# Patient Record
Sex: FEMALE | Race: WHITE | NOT HISPANIC OR LATINO | URBAN - METROPOLITAN AREA
[De-identification: names, ages, dates, MRNs, and addresses within clinical notes are randomized per-mention and may not be internally consistent; named-entity substitution may affect disease eponyms.]

---

## 2017-07-18 ENCOUNTER — OUTPATIENT (OUTPATIENT)
Dept: OUTPATIENT SERVICES | Facility: HOSPITAL | Age: 32
LOS: 1 days | End: 2017-07-18
Payer: COMMERCIAL

## 2017-07-18 PROCEDURE — 99214 OFFICE O/P EST MOD 30 MIN: CPT

## 2017-07-19 DIAGNOSIS — Z3A.00 WEEKS OF GESTATION OF PREGNANCY NOT SPECIFIED: ICD-10-CM

## 2017-07-19 DIAGNOSIS — O26.899 OTHER SPECIFIED PREGNANCY RELATED CONDITIONS, UNSPECIFIED TRIMESTER: ICD-10-CM

## 2017-07-30 ENCOUNTER — INPATIENT (INPATIENT)
Facility: HOSPITAL | Age: 32
LOS: 2 days | Discharge: HOME | End: 2017-08-02
Attending: OBSTETRICS & GYNECOLOGY | Admitting: OBSTETRICS & GYNECOLOGY
Payer: COMMERCIAL

## 2017-07-30 VITALS — WEIGHT: 143.96 LBS | HEIGHT: 61 IN

## 2017-07-30 LAB
BASOPHILS NFR BLD AUTO: 0.2 % — SIGNIFICANT CHANGE UP (ref 0–2)
BLD GP AB SCN SERPL QL: NEGATIVE — SIGNIFICANT CHANGE UP
EOSINOPHIL NFR BLD AUTO: 0.2 % — SIGNIFICANT CHANGE UP (ref 0–6)
HCT VFR BLD CALC: 35.7 % — SIGNIFICANT CHANGE UP (ref 34.5–45)
HGB BLD-MCNC: 11.9 G/DL — SIGNIFICANT CHANGE UP (ref 11.5–15.5)
LYMPHOCYTES # BLD AUTO: 9.9 % — LOW (ref 13–44)
MCHC RBC-ENTMCNC: 30.7 PG — SIGNIFICANT CHANGE UP (ref 27–34)
MCHC RBC-ENTMCNC: 33.3 G/DL — SIGNIFICANT CHANGE UP (ref 32–36)
MCV RBC AUTO: 92 FL — SIGNIFICANT CHANGE UP (ref 80–100)
MONOCYTES NFR BLD AUTO: 6.1 % — SIGNIFICANT CHANGE UP (ref 2–14)
NEUTROPHILS NFR BLD AUTO: 83.6 % — HIGH (ref 43–77)
PLATELET # BLD AUTO: 146 K/UL — LOW (ref 150–400)
RBC # BLD: 3.88 M/UL — SIGNIFICANT CHANGE UP (ref 3.8–5.2)
RBC # FLD: 12.9 % — SIGNIFICANT CHANGE UP (ref 10.3–16.9)
RH IG SCN BLD-IMP: POSITIVE — SIGNIFICANT CHANGE UP
WBC # BLD: 11.1 K/UL — HIGH (ref 3.8–10.5)
WBC # FLD AUTO: 11.1 K/UL — HIGH (ref 3.8–10.5)

## 2017-07-30 RX ORDER — SODIUM CHLORIDE 9 MG/ML
1000 INJECTION, SOLUTION INTRAVENOUS ONCE
Qty: 0 | Refills: 0 | Status: DISCONTINUED | OUTPATIENT
Start: 2017-07-30 | End: 2017-07-31

## 2017-07-30 RX ORDER — SODIUM CHLORIDE 9 MG/ML
1000 INJECTION, SOLUTION INTRAVENOUS
Qty: 0 | Refills: 0 | Status: DISCONTINUED | OUTPATIENT
Start: 2017-07-30 | End: 2017-07-31

## 2017-07-30 RX ORDER — OXYTOCIN 10 UNIT/ML
333.33 VIAL (ML) INJECTION
Qty: 20 | Refills: 0 | Status: DISCONTINUED | OUTPATIENT
Start: 2017-07-30 | End: 2017-07-31

## 2017-07-30 RX ORDER — CITRIC ACID/SODIUM CITRATE 300-500 MG
15 SOLUTION, ORAL ORAL EVERY 4 HOURS
Qty: 0 | Refills: 0 | Status: DISCONTINUED | OUTPATIENT
Start: 2017-07-30 | End: 2017-07-31

## 2017-07-31 RX ORDER — DIPHENHYDRAMINE HCL 50 MG
25 CAPSULE ORAL EVERY 6 HOURS
Qty: 0 | Refills: 0 | Status: DISCONTINUED | OUTPATIENT
Start: 2017-07-31 | End: 2017-08-02

## 2017-07-31 RX ORDER — GLYCERIN ADULT
1 SUPPOSITORY, RECTAL RECTAL AT BEDTIME
Qty: 0 | Refills: 0 | Status: DISCONTINUED | OUTPATIENT
Start: 2017-07-31 | End: 2017-08-02

## 2017-07-31 RX ORDER — AER TRAVELER 0.5 G/1
1 SOLUTION RECTAL; TOPICAL EVERY 4 HOURS
Qty: 0 | Refills: 0 | Status: DISCONTINUED | OUTPATIENT
Start: 2017-07-31 | End: 2017-08-02

## 2017-07-31 RX ORDER — MAGNESIUM HYDROXIDE 400 MG/1
30 TABLET, CHEWABLE ORAL
Qty: 0 | Refills: 0 | Status: DISCONTINUED | OUTPATIENT
Start: 2017-07-31 | End: 2017-08-02

## 2017-07-31 RX ORDER — ACETAMINOPHEN 500 MG
650 TABLET ORAL EVERY 6 HOURS
Qty: 0 | Refills: 0 | Status: DISCONTINUED | OUTPATIENT
Start: 2017-07-31 | End: 2017-08-02

## 2017-07-31 RX ORDER — BENZOCAINE 10 %
1 GEL (GRAM) MUCOUS MEMBRANE EVERY 6 HOURS
Qty: 0 | Refills: 0 | Status: DISCONTINUED | OUTPATIENT
Start: 2017-07-31 | End: 2017-08-02

## 2017-07-31 RX ORDER — IBUPROFEN 200 MG
600 TABLET ORAL EVERY 6 HOURS
Qty: 0 | Refills: 0 | Status: DISCONTINUED | OUTPATIENT
Start: 2017-07-31 | End: 2017-08-02

## 2017-07-31 RX ORDER — PRAMOXINE HYDROCHLORIDE 150 MG/15G
1 AEROSOL, FOAM RECTAL EVERY 4 HOURS
Qty: 0 | Refills: 0 | Status: DISCONTINUED | OUTPATIENT
Start: 2017-07-31 | End: 2017-08-02

## 2017-07-31 RX ORDER — HYDROCORTISONE 1 %
1 OINTMENT (GRAM) TOPICAL EVERY 4 HOURS
Qty: 0 | Refills: 0 | Status: DISCONTINUED | OUTPATIENT
Start: 2017-07-31 | End: 2017-08-02

## 2017-07-31 RX ORDER — OXYCODONE AND ACETAMINOPHEN 5; 325 MG/1; MG/1
2 TABLET ORAL EVERY 6 HOURS
Qty: 0 | Refills: 0 | Status: DISCONTINUED | OUTPATIENT
Start: 2017-07-31 | End: 2017-08-02

## 2017-07-31 RX ORDER — TETANUS TOXOID, REDUCED DIPHTHERIA TOXOID AND ACELLULAR PERTUSSIS VACCINE, ADSORBED 5; 2.5; 8; 8; 2.5 [IU]/.5ML; [IU]/.5ML; UG/.5ML; UG/.5ML; UG/.5ML
0.5 SUSPENSION INTRAMUSCULAR ONCE
Qty: 0 | Refills: 0 | Status: DISCONTINUED | OUTPATIENT
Start: 2017-07-31 | End: 2017-08-02

## 2017-07-31 RX ORDER — LEVOTHYROXINE SODIUM 125 MCG
50 TABLET ORAL DAILY
Qty: 0 | Refills: 0 | Status: DISCONTINUED | OUTPATIENT
Start: 2017-07-31 | End: 2017-07-31

## 2017-07-31 RX ORDER — SIMETHICONE 80 MG/1
80 TABLET, CHEWABLE ORAL EVERY 6 HOURS
Qty: 0 | Refills: 0 | Status: DISCONTINUED | OUTPATIENT
Start: 2017-07-31 | End: 2017-08-02

## 2017-07-31 RX ORDER — OXYTOCIN 10 UNIT/ML
41.67 VIAL (ML) INJECTION
Qty: 20 | Refills: 0 | Status: DISCONTINUED | OUTPATIENT
Start: 2017-07-31 | End: 2017-08-02

## 2017-07-31 RX ORDER — SODIUM CHLORIDE 9 MG/ML
3 INJECTION INTRAMUSCULAR; INTRAVENOUS; SUBCUTANEOUS EVERY 8 HOURS
Qty: 0 | Refills: 0 | Status: DISCONTINUED | OUTPATIENT
Start: 2017-07-31 | End: 2017-08-02

## 2017-07-31 RX ORDER — LANOLIN
1 OINTMENT (GRAM) TOPICAL EVERY 6 HOURS
Qty: 0 | Refills: 0 | Status: DISCONTINUED | OUTPATIENT
Start: 2017-07-31 | End: 2017-08-02

## 2017-07-31 RX ORDER — DOCUSATE SODIUM 100 MG
100 CAPSULE ORAL
Qty: 0 | Refills: 0 | Status: DISCONTINUED | OUTPATIENT
Start: 2017-07-31 | End: 2017-08-02

## 2017-07-31 RX ADMIN — Medication 100 MILLIGRAM(S): at 18:06

## 2017-07-31 RX ADMIN — Medication 650 MILLIGRAM(S): at 13:00

## 2017-07-31 RX ADMIN — AER TRAVELER 1 APPLICATION(S): 0.5 SOLUTION RECTAL; TOPICAL at 19:37

## 2017-07-31 RX ADMIN — SODIUM CHLORIDE 3 MILLILITER(S): 9 INJECTION INTRAMUSCULAR; INTRAVENOUS; SUBCUTANEOUS at 07:54

## 2017-07-31 RX ADMIN — Medication 600 MILLIGRAM(S): at 19:35

## 2017-07-31 RX ADMIN — Medication 600 MILLIGRAM(S): at 09:41

## 2017-07-31 RX ADMIN — Medication 600 MILLIGRAM(S): at 14:47

## 2017-07-31 RX ADMIN — SODIUM CHLORIDE 3 MILLILITER(S): 9 INJECTION INTRAMUSCULAR; INTRAVENOUS; SUBCUTANEOUS at 22:54

## 2017-07-31 RX ADMIN — Medication 600 MILLIGRAM(S): at 15:40

## 2017-07-31 RX ADMIN — Medication 1 TABLET(S): at 12:04

## 2017-07-31 RX ADMIN — Medication 650 MILLIGRAM(S): at 12:04

## 2017-07-31 RX ADMIN — Medication 1 SPRAY(S): at 19:37

## 2017-07-31 RX ADMIN — Medication 600 MILLIGRAM(S): at 08:24

## 2017-07-31 RX ADMIN — SODIUM CHLORIDE 3 MILLILITER(S): 9 INJECTION INTRAMUSCULAR; INTRAVENOUS; SUBCUTANEOUS at 14:49

## 2017-07-31 RX ADMIN — Medication 600 MILLIGRAM(S): at 20:30

## 2017-08-01 LAB — T PALLIDUM AB TITR SER: NEGATIVE — SIGNIFICANT CHANGE UP

## 2017-08-01 RX ADMIN — Medication 650 MILLIGRAM(S): at 22:59

## 2017-08-01 RX ADMIN — Medication 650 MILLIGRAM(S): at 09:51

## 2017-08-01 RX ADMIN — Medication 600 MILLIGRAM(S): at 13:45

## 2017-08-01 RX ADMIN — Medication 600 MILLIGRAM(S): at 01:37

## 2017-08-01 RX ADMIN — Medication 600 MILLIGRAM(S): at 19:54

## 2017-08-01 RX ADMIN — Medication 600 MILLIGRAM(S): at 07:54

## 2017-08-01 RX ADMIN — Medication 100 MILLIGRAM(S): at 05:23

## 2017-08-01 RX ADMIN — Medication 650 MILLIGRAM(S): at 23:50

## 2017-08-01 RX ADMIN — Medication 100 MILLIGRAM(S): at 19:54

## 2017-08-01 RX ADMIN — Medication 650 MILLIGRAM(S): at 10:07

## 2017-08-01 RX ADMIN — Medication 600 MILLIGRAM(S): at 08:16

## 2017-08-01 RX ADMIN — Medication 1 TABLET(S): at 13:46

## 2017-08-01 RX ADMIN — Medication 600 MILLIGRAM(S): at 20:41

## 2017-08-01 RX ADMIN — SODIUM CHLORIDE 3 MILLILITER(S): 9 INJECTION INTRAMUSCULAR; INTRAVENOUS; SUBCUTANEOUS at 19:05

## 2017-08-01 RX ADMIN — Medication 600 MILLIGRAM(S): at 13:59

## 2017-08-01 RX ADMIN — Medication 600 MILLIGRAM(S): at 02:10

## 2017-08-01 RX ADMIN — SODIUM CHLORIDE 3 MILLILITER(S): 9 INJECTION INTRAMUSCULAR; INTRAVENOUS; SUBCUTANEOUS at 05:23

## 2017-08-01 NOTE — PROGRESS NOTE ADULT - SUBJECTIVE AND OBJECTIVE BOX
Patient evaluated at bedside.  No acute events overnight.  She reports pain is well controlled with motrin.  She denies heavy vaginal bleeding.  Has some perineal discomfort in area of perineal laceration; pain somewhat relieved with motrin, ice packs and witch hazel pads.  She has been ambulating without assistance, voiding spontaneously, and is breastfeeding.    Physical Exam:  T(C): 36.6 (08-01-17 @ 06:00), Max: 36.6 (08-01-17 @ 06:00)  HR: 97 (08-01-17 @ 06:00) (97 - 97)  BP: 117/80 (08-01-17 @ 06:00) (117/80 - 117/81)  RR: 18 (08-01-17 @ 06:00) (16 - 18)  SpO2: 98% (08-01-17 @ 06:00) (98% - 98%)  Wt(kg): --    GA: NAD, AAOx3  Abd: soft, nontender, nondistended, no rebound or guarding, uterus firm at midline,   fundus below umbilicus  : lochia WNL  Extremities: no swelling or calf tenderness                            11.9   11.1  )-----------( 146      ( 30 Jul 2017 20:13 )             35.7

## 2017-08-02 VITALS
SYSTOLIC BLOOD PRESSURE: 122 MMHG | DIASTOLIC BLOOD PRESSURE: 70 MMHG | RESPIRATION RATE: 18 BRPM | HEART RATE: 80 BPM | TEMPERATURE: 98 F | OXYGEN SATURATION: 99 %

## 2017-08-02 PROCEDURE — 86901 BLOOD TYPING SEROLOGIC RH(D): CPT

## 2017-08-02 PROCEDURE — 85025 COMPLETE CBC W/AUTO DIFF WBC: CPT

## 2017-08-02 PROCEDURE — 86850 RBC ANTIBODY SCREEN: CPT

## 2017-08-02 PROCEDURE — 88307 TISSUE EXAM BY PATHOLOGIST: CPT

## 2017-08-02 PROCEDURE — 86780 TREPONEMA PALLIDUM: CPT

## 2017-08-02 PROCEDURE — 86900 BLOOD TYPING SEROLOGIC ABO: CPT

## 2017-08-02 RX ORDER — BENZOYL PEROXIDE MICRONIZED 5.8 %
1 TOWELETTE (EA) TOPICAL
Qty: 0 | Refills: 0 | COMMUNITY

## 2017-08-02 RX ORDER — SELENIOUS ACID 40 UG/ML
0 INJECTION, SOLUTION INTRAVENOUS
Qty: 0 | Refills: 0 | COMMUNITY

## 2017-08-02 RX ORDER — LEVOTHYROXINE SODIUM 125 MCG
1 TABLET ORAL
Qty: 0 | Refills: 0 | COMMUNITY

## 2017-08-02 RX ADMIN — Medication 600 MILLIGRAM(S): at 02:15

## 2017-08-02 RX ADMIN — Medication 100 MILLIGRAM(S): at 07:15

## 2017-08-02 RX ADMIN — Medication 600 MILLIGRAM(S): at 07:54

## 2017-08-02 RX ADMIN — Medication 600 MILLIGRAM(S): at 03:00

## 2017-08-02 RX ADMIN — Medication 600 MILLIGRAM(S): at 07:15

## 2017-08-02 RX ADMIN — Medication 600 MILLIGRAM(S): at 12:57

## 2017-08-02 RX ADMIN — Medication 1 TABLET(S): at 11:56

## 2017-08-02 NOTE — PROGRESS NOTE ADULT - PROBLEM SELECTOR PLAN 1
A/P 31y PPD#2 s/p , stable  1. Pain: well controlled on OPM  2. GI: Regular diet  3. : voiding  4. DVT prophylaxis: ambulate  5. Dispo: discharge today, patient to follow up in 6 weeks, prn sooner

## 2017-08-02 NOTE — PROGRESS NOTE ADULT - SUBJECTIVE AND OBJECTIVE BOX
Patient evaluated at bedside.   She reports pain is well controlled with  She denies headache, dizziness, chest pain, palpitations, shortness of breathe, nausea, vomiting, heavy vaginal bleeding or perineal discomfort.  She has been ambulating without assistance, voiding spontaneously, and is breastfeeding.    Physical Exam:  Vital Signs Last 24 Hrs  T(C): 36.7 (02 Aug 2017 05:57), Max: 36.7 (02 Aug 2017 05:57)  T(F): 98 (02 Aug 2017 05:57), Max: 98 (02 Aug 2017 05:57)  HR: 83 (02 Aug 2017 05:57) (83 - 101)  BP: 119/83 (02 Aug 2017 05:57) (119/83 - 122/83)  BP(mean): --  RR: 17 (02 Aug 2017 05:57) (17 - 18)  SpO2: 100% (02 Aug 2017 05:57) (98% - 100%)    GA: NAD, A+0 x 3  CV: RRR  Pulm: CTAB  Breasts: soft, nontender, no palpable masses  Abd: + BS, soft, nontender, nondistended, no rebound or guarding, uterus firm at midline,   fb below umbilicus  : lochia WNL, perineum healing well  Extremities: no swelling or calf tenderness, reflexes +2 bilaterally

## 2017-08-02 NOTE — PROGRESS NOTE ADULT - SUBJECTIVE AND OBJECTIVE BOX
Patient evaluated at bedside.  No acute events overnight.  She reports pain is well controlled with motrin.  She denies heavy vaginal bleeding; perineal discomfort still present but pain improves slightly with application of ice packs and with motrin.  She has been ambulating without assistance, voiding spontaneously, and is breastfeeding.  Had bowel movement yesterday.    Physical Exam:  T(C): 36.7 (08-02-17 @ 05:57), Max: 36.7 (08-02-17 @ 05:57)  HR: 83 (08-02-17 @ 05:57) (83 - 83)  BP: 119/83 (08-02-17 @ 05:57) (119/83 - 119/83)  RR: 17 (08-02-17 @ 05:57) (17 - 17)  SpO2: 100% (08-02-17 @ 05:57) (100% - 100%)  Wt(kg): --    GA: NAD, AAOx3  Abd: soft, nontender, nondistended, no rebound or guarding, uterus firm at midline,   fundus below umbilicus  : lochia WNL  Extremities: no swelling or calf tenderness

## 2017-08-02 NOTE — DISCHARGE NOTE OB - CARE PROVIDER_API CALL
Marialuisa Malcolm (MD), Obstetrics and Gynecology  [address] [place] [address]  Phone: [phone]  Fax: [phone]

## 2017-08-02 NOTE — DISCHARGE NOTE OB - PATIENT PORTAL LINK FT
“You can access the [facility] Patient Portal, offered by [facility], by registering with the following website: [email or web address]”

## 2017-08-02 NOTE — DISCHARGE NOTE OB - CARE PLAN
Principal Discharge DX:	Postpartum state  Goal:	Healthy postpartum period.  Instructions for follow-up, activity and diet:	Follow up with your OBGYN in 6 weeks. Pelvic rest for 6 weeks.

## 2017-08-04 DIAGNOSIS — Z3A.39 39 WEEKS GESTATION OF PREGNANCY: ICD-10-CM

## 2017-08-04 DIAGNOSIS — E04.1 NONTOXIC SINGLE THYROID NODULE: ICD-10-CM

## 2017-08-04 DIAGNOSIS — E03.9 HYPOTHYROIDISM, UNSPECIFIED: ICD-10-CM

## 2017-08-04 DIAGNOSIS — Z34.83 ENCOUNTER FOR SUPERVISION OF OTHER NORMAL PREGNANCY, THIRD TRIMESTER: ICD-10-CM

## 2017-08-10 LAB — SURGICAL PATHOLOGY STUDY: SIGNIFICANT CHANGE UP

## 2019-03-06 ENCOUNTER — INPATIENT (INPATIENT)
Facility: HOSPITAL | Age: 34
LOS: 1 days | Discharge: HOME | End: 2019-03-08
Attending: OBSTETRICS & GYNECOLOGY | Admitting: OBSTETRICS & GYNECOLOGY
Payer: COMMERCIAL

## 2019-03-06 VITALS — WEIGHT: 149.91 LBS | HEIGHT: 62 IN

## 2019-03-06 DIAGNOSIS — Z3A.00 WEEKS OF GESTATION OF PREGNANCY NOT SPECIFIED: ICD-10-CM

## 2019-03-06 DIAGNOSIS — O26.899 OTHER SPECIFIED PREGNANCY RELATED CONDITIONS, UNSPECIFIED TRIMESTER: ICD-10-CM

## 2019-03-06 LAB
BASOPHILS # BLD AUTO: 0.04 K/UL — SIGNIFICANT CHANGE UP (ref 0–0.2)
BASOPHILS NFR BLD AUTO: 0.5 % — SIGNIFICANT CHANGE UP (ref 0–2)
BLD GP AB SCN SERPL QL: NEGATIVE — SIGNIFICANT CHANGE UP
EOSINOPHIL # BLD AUTO: 0.03 K/UL — SIGNIFICANT CHANGE UP (ref 0–0.5)
EOSINOPHIL NFR BLD AUTO: 0.3 % — SIGNIFICANT CHANGE UP (ref 0–6)
HCT VFR BLD CALC: 33.9 % — LOW (ref 34.5–45)
HGB BLD-MCNC: 11.3 G/DL — LOW (ref 11.5–15.5)
IMM GRANULOCYTES NFR BLD AUTO: 1.2 % — SIGNIFICANT CHANGE UP (ref 0–1.5)
LYMPHOCYTES # BLD AUTO: 1.31 K/UL — SIGNIFICANT CHANGE UP (ref 1–3.3)
LYMPHOCYTES # BLD AUTO: 15.2 % — SIGNIFICANT CHANGE UP (ref 13–44)
MCHC RBC-ENTMCNC: 28.8 PG — SIGNIFICANT CHANGE UP (ref 27–34)
MCHC RBC-ENTMCNC: 33.3 GM/DL — SIGNIFICANT CHANGE UP (ref 32–36)
MCV RBC AUTO: 86.5 FL — SIGNIFICANT CHANGE UP (ref 80–100)
MONOCYTES # BLD AUTO: 0.46 K/UL — SIGNIFICANT CHANGE UP (ref 0–0.9)
MONOCYTES NFR BLD AUTO: 5.3 % — SIGNIFICANT CHANGE UP (ref 2–14)
NEUTROPHILS # BLD AUTO: 6.68 K/UL — SIGNIFICANT CHANGE UP (ref 1.8–7.4)
NEUTROPHILS NFR BLD AUTO: 77.5 % — HIGH (ref 43–77)
NRBC # BLD: 0 /100 WBCS — SIGNIFICANT CHANGE UP (ref 0–0)
PLATELET # BLD AUTO: 151 K/UL — SIGNIFICANT CHANGE UP (ref 150–400)
RBC # BLD: 3.92 M/UL — SIGNIFICANT CHANGE UP (ref 3.8–5.2)
RBC # FLD: 14.6 % — HIGH (ref 10.3–14.5)
RH IG SCN BLD-IMP: POSITIVE — SIGNIFICANT CHANGE UP
WBC # BLD: 8.62 K/UL — SIGNIFICANT CHANGE UP (ref 3.8–10.5)
WBC # FLD AUTO: 8.62 K/UL — SIGNIFICANT CHANGE UP (ref 3.8–10.5)

## 2019-03-06 RX ORDER — MAGNESIUM HYDROXIDE 400 MG/1
30 TABLET, CHEWABLE ORAL
Qty: 0 | Refills: 0 | Status: DISCONTINUED | OUTPATIENT
Start: 2019-03-06 | End: 2019-03-08

## 2019-03-06 RX ORDER — LEVOTHYROXINE SODIUM 125 MCG
1 TABLET ORAL
Qty: 0 | Refills: 0 | COMMUNITY

## 2019-03-06 RX ORDER — BENZOCAINE 10 %
1 GEL (GRAM) MUCOUS MEMBRANE EVERY 6 HOURS
Qty: 0 | Refills: 0 | Status: DISCONTINUED | OUTPATIENT
Start: 2019-03-06 | End: 2019-03-08

## 2019-03-06 RX ORDER — SODIUM CHLORIDE 9 MG/ML
1000 INJECTION, SOLUTION INTRAVENOUS
Qty: 0 | Refills: 0 | Status: DISCONTINUED | OUTPATIENT
Start: 2019-03-06 | End: 2019-03-06

## 2019-03-06 RX ORDER — FENTANYL/BUPIVACAINE/NS/PF 2MCG/ML-.1
250 PLASTIC BAG, INJECTION (ML) INJECTION
Qty: 0 | Refills: 0 | Status: DISCONTINUED | OUTPATIENT
Start: 2019-03-06 | End: 2019-03-06

## 2019-03-06 RX ORDER — OXYTOCIN 10 UNIT/ML
333.33 VIAL (ML) INJECTION
Qty: 20 | Refills: 0 | Status: DISCONTINUED | OUTPATIENT
Start: 2019-03-06 | End: 2019-03-06

## 2019-03-06 RX ORDER — BENZOYL PEROXIDE MICRONIZED 5.8 %
1 TOWELETTE (EA) TOPICAL
Qty: 0 | Refills: 0 | COMMUNITY

## 2019-03-06 RX ORDER — OXYCODONE AND ACETAMINOPHEN 5; 325 MG/1; MG/1
1 TABLET ORAL
Qty: 0 | Refills: 0 | Status: DISCONTINUED | OUTPATIENT
Start: 2019-03-06 | End: 2019-03-08

## 2019-03-06 RX ORDER — SODIUM CHLORIDE 9 MG/ML
1000 INJECTION, SOLUTION INTRAVENOUS ONCE
Qty: 0 | Refills: 0 | Status: COMPLETED | OUTPATIENT
Start: 2019-03-06 | End: 2019-03-06

## 2019-03-06 RX ORDER — HYDROCORTISONE 1 %
1 OINTMENT (GRAM) TOPICAL EVERY 4 HOURS
Qty: 0 | Refills: 0 | Status: DISCONTINUED | OUTPATIENT
Start: 2019-03-06 | End: 2019-03-08

## 2019-03-06 RX ORDER — SIMETHICONE 80 MG/1
80 TABLET, CHEWABLE ORAL EVERY 6 HOURS
Qty: 0 | Refills: 0 | Status: DISCONTINUED | OUTPATIENT
Start: 2019-03-06 | End: 2019-03-08

## 2019-03-06 RX ORDER — ACETAMINOPHEN 500 MG
650 TABLET ORAL EVERY 6 HOURS
Qty: 0 | Refills: 0 | Status: DISCONTINUED | OUTPATIENT
Start: 2019-03-06 | End: 2019-03-08

## 2019-03-06 RX ORDER — OXYTOCIN 10 UNIT/ML
2 VIAL (ML) INJECTION
Qty: 30 | Refills: 0 | Status: DISCONTINUED | OUTPATIENT
Start: 2019-03-06 | End: 2019-03-08

## 2019-03-06 RX ORDER — OXYCODONE AND ACETAMINOPHEN 5; 325 MG/1; MG/1
2 TABLET ORAL EVERY 6 HOURS
Qty: 0 | Refills: 0 | Status: DISCONTINUED | OUTPATIENT
Start: 2019-03-06 | End: 2019-03-08

## 2019-03-06 RX ORDER — LANOLIN
1 OINTMENT (GRAM) TOPICAL EVERY 6 HOURS
Qty: 0 | Refills: 0 | Status: DISCONTINUED | OUTPATIENT
Start: 2019-03-06 | End: 2019-03-08

## 2019-03-06 RX ORDER — SODIUM CHLORIDE 9 MG/ML
3 INJECTION INTRAMUSCULAR; INTRAVENOUS; SUBCUTANEOUS EVERY 8 HOURS
Qty: 0 | Refills: 0 | Status: DISCONTINUED | OUTPATIENT
Start: 2019-03-06 | End: 2019-03-08

## 2019-03-06 RX ORDER — AER TRAVELER 0.5 G/1
1 SOLUTION RECTAL; TOPICAL EVERY 4 HOURS
Qty: 0 | Refills: 0 | Status: DISCONTINUED | OUTPATIENT
Start: 2019-03-06 | End: 2019-03-08

## 2019-03-06 RX ORDER — DIBUCAINE 1 %
1 OINTMENT (GRAM) RECTAL EVERY 4 HOURS
Qty: 0 | Refills: 0 | Status: DISCONTINUED | OUTPATIENT
Start: 2019-03-06 | End: 2019-03-08

## 2019-03-06 RX ORDER — DOCUSATE SODIUM 100 MG
100 CAPSULE ORAL
Qty: 0 | Refills: 0 | Status: DISCONTINUED | OUTPATIENT
Start: 2019-03-06 | End: 2019-03-08

## 2019-03-06 RX ORDER — PRAMOXINE HYDROCHLORIDE 150 MG/15G
1 AEROSOL, FOAM RECTAL EVERY 4 HOURS
Qty: 0 | Refills: 0 | Status: DISCONTINUED | OUTPATIENT
Start: 2019-03-06 | End: 2019-03-08

## 2019-03-06 RX ORDER — DIPHENHYDRAMINE HCL 50 MG
25 CAPSULE ORAL EVERY 6 HOURS
Qty: 0 | Refills: 0 | Status: DISCONTINUED | OUTPATIENT
Start: 2019-03-06 | End: 2019-03-08

## 2019-03-06 RX ORDER — GLYCERIN ADULT
1 SUPPOSITORY, RECTAL RECTAL AT BEDTIME
Qty: 0 | Refills: 0 | Status: DISCONTINUED | OUTPATIENT
Start: 2019-03-06 | End: 2019-03-08

## 2019-03-06 RX ORDER — IBUPROFEN 200 MG
600 TABLET ORAL EVERY 6 HOURS
Qty: 0 | Refills: 0 | Status: DISCONTINUED | OUTPATIENT
Start: 2019-03-06 | End: 2019-03-08

## 2019-03-06 RX ORDER — TETANUS TOXOID, REDUCED DIPHTHERIA TOXOID AND ACELLULAR PERTUSSIS VACCINE, ADSORBED 5; 2.5; 8; 8; 2.5 [IU]/.5ML; [IU]/.5ML; UG/.5ML; UG/.5ML; UG/.5ML
0.5 SUSPENSION INTRAMUSCULAR ONCE
Qty: 0 | Refills: 0 | Status: DISCONTINUED | OUTPATIENT
Start: 2019-03-06 | End: 2019-03-08

## 2019-03-06 RX ORDER — OXYTOCIN 10 UNIT/ML
41.67 VIAL (ML) INJECTION
Qty: 20 | Refills: 0 | Status: DISCONTINUED | OUTPATIENT
Start: 2019-03-06 | End: 2019-03-08

## 2019-03-06 RX ADMIN — SODIUM CHLORIDE 125 MILLILITER(S): 9 INJECTION, SOLUTION INTRAVENOUS at 14:50

## 2019-03-06 RX ADMIN — Medication 600 MILLIGRAM(S): at 22:30

## 2019-03-06 RX ADMIN — Medication 2 MILLIUNIT(S)/MIN: at 12:19

## 2019-03-06 RX ADMIN — Medication 250 MILLILITER(S): at 18:33

## 2019-03-06 RX ADMIN — SODIUM CHLORIDE 2000 MILLILITER(S): 9 INJECTION, SOLUTION INTRAVENOUS at 18:26

## 2019-03-06 RX ADMIN — Medication 600 MILLIGRAM(S): at 23:30

## 2019-03-06 RX ADMIN — Medication 125 MILLIUNIT(S)/MIN: at 20:42

## 2019-03-06 RX ADMIN — SODIUM CHLORIDE 125 MILLILITER(S): 9 INJECTION, SOLUTION INTRAVENOUS at 11:44

## 2019-03-06 NOTE — DISCHARGE NOTE OB - CARE PROVIDER_API CALL
Kimberlyn Jean (DO; MS)  Obstetrics and Gynecology  [address] [place] [address]  Phone: [phone]  Fax: [phone]  Follow Up Time:

## 2019-03-06 NOTE — DISCHARGE NOTE OB - CARE PLAN
Principal Discharge DX:	Postpartum state  Goal:	discharge home  Assessment and plan of treatment:	Patient to be discharged home. Nothing per vagina, no tampons, tub baths, intercourse. patient to notify provider for fever >101, heavy vaginal bleeding, extreme abdominal pain. Patient to follow up in office in 6w

## 2019-03-06 NOTE — DISCHARGE NOTE OB - PATIENT PORTAL LINK FT
You can access the [facility] Patient Portal, offered by [facility], by registering with the following website: [email or web address]

## 2019-03-06 NOTE — DISCHARGE NOTE OB - PLAN OF CARE
discharge home Patient to be discharged home. Nothing per vagina, no tampons, tub baths, intercourse. patient to notify provider for fever >101, heavy vaginal bleeding, extreme abdominal pain. Patient to follow up in office in 6w

## 2019-03-07 LAB — T PALLIDUM AB TITR SER: NEGATIVE — SIGNIFICANT CHANGE UP

## 2019-03-07 RX ADMIN — Medication 650 MILLIGRAM(S): at 16:48

## 2019-03-07 RX ADMIN — Medication 600 MILLIGRAM(S): at 05:43

## 2019-03-07 RX ADMIN — Medication 650 MILLIGRAM(S): at 01:46

## 2019-03-07 RX ADMIN — Medication 650 MILLIGRAM(S): at 23:03

## 2019-03-07 RX ADMIN — SODIUM CHLORIDE 3 MILLILITER(S): 9 INJECTION INTRAMUSCULAR; INTRAVENOUS; SUBCUTANEOUS at 10:35

## 2019-03-07 RX ADMIN — Medication 100 MILLIGRAM(S): at 23:04

## 2019-03-07 RX ADMIN — Medication 100 MILLIGRAM(S): at 10:58

## 2019-03-07 RX ADMIN — Medication 650 MILLIGRAM(S): at 15:54

## 2019-03-07 RX ADMIN — Medication 600 MILLIGRAM(S): at 13:50

## 2019-03-07 RX ADMIN — Medication 650 MILLIGRAM(S): at 23:45

## 2019-03-07 RX ADMIN — Medication 600 MILLIGRAM(S): at 12:35

## 2019-03-07 RX ADMIN — Medication 650 MILLIGRAM(S): at 08:51

## 2019-03-07 RX ADMIN — Medication 1 APPLICATION(S): at 01:46

## 2019-03-07 RX ADMIN — Medication 1 SPRAY(S): at 01:46

## 2019-03-07 RX ADMIN — Medication 600 MILLIGRAM(S): at 18:09

## 2019-03-07 RX ADMIN — Medication 1 TABLET(S): at 12:35

## 2019-03-07 RX ADMIN — Medication 600 MILLIGRAM(S): at 06:15

## 2019-03-07 RX ADMIN — Medication 650 MILLIGRAM(S): at 09:50

## 2019-03-07 RX ADMIN — Medication 650 MILLIGRAM(S): at 02:30

## 2019-03-07 NOTE — PROGRESS NOTE ADULT - ASSESSMENT
A/P yo 33y  s/p , PP#1 , stable  1. Pain: OPM  2. GI: Reg  3. :  Voiding  4. DVT prophylaxis: SCDs, ambulation  5. Dispo: PP#2

## 2019-03-07 NOTE — PROGRESS NOTE ADULT - SUBJECTIVE AND OBJECTIVE BOX
Patient evaluated at bedside.     She reports pain is well controlled.    She has been ambulating without assistance, voiding spontaneously, and is breastfeeding.    She denies HA, dizziness, chest pain, palpitations, shortness of breath, n/v, heavy vaginal bleeding or perineal discomfort.    Physical Exam:  Vital Signs Last 24 Hrs  T(C): 36.4 (07 Mar 2019 06:00), Max: 36.7 (06 Mar 2019 21:15)  T(F): 97.6 (07 Mar 2019 06:00), Max: 98.1 (06 Mar 2019 21:15)  HR: 85 (07 Mar 2019 06:00) (85 - 104)  BP: 104/67 (07 Mar 2019 06:00) (104/67 - 118/64)  BP(mean): --  RR: 17 (07 Mar 2019 06:00) (16 - 18)  SpO2: 98% (07 Mar 2019 06:00) (98% - 100%)    GA: NAD, A+0 x 3  Abd: + BS, soft, nontender, nondistended, no rebound or guarding, uterus firm at midline  : lochia WNL  Extremities: no edema or calf tenderness                          11.3   8.62  )-----------( 151      ( 06 Mar 2019 12:07 )             33.9

## 2019-03-08 VITALS
TEMPERATURE: 98 F | RESPIRATION RATE: 18 BRPM | DIASTOLIC BLOOD PRESSURE: 76 MMHG | HEART RATE: 94 BPM | SYSTOLIC BLOOD PRESSURE: 116 MMHG | OXYGEN SATURATION: 97 %

## 2019-03-08 PROCEDURE — 88307 TISSUE EXAM BY PATHOLOGIST: CPT

## 2019-03-08 PROCEDURE — 86900 BLOOD TYPING SEROLOGIC ABO: CPT

## 2019-03-08 PROCEDURE — 99214 OFFICE O/P EST MOD 30 MIN: CPT

## 2019-03-08 PROCEDURE — 86901 BLOOD TYPING SEROLOGIC RH(D): CPT

## 2019-03-08 PROCEDURE — 85025 COMPLETE CBC W/AUTO DIFF WBC: CPT

## 2019-03-08 PROCEDURE — 86780 TREPONEMA PALLIDUM: CPT

## 2019-03-08 PROCEDURE — 86850 RBC ANTIBODY SCREEN: CPT

## 2019-03-08 RX ADMIN — Medication 600 MILLIGRAM(S): at 06:37

## 2019-03-08 RX ADMIN — Medication 600 MILLIGRAM(S): at 12:18

## 2019-03-08 RX ADMIN — Medication 100 MILLIGRAM(S): at 12:17

## 2019-03-08 RX ADMIN — Medication 600 MILLIGRAM(S): at 07:15

## 2019-03-08 RX ADMIN — Medication 600 MILLIGRAM(S): at 01:10

## 2019-03-08 RX ADMIN — Medication 600 MILLIGRAM(S): at 14:10

## 2019-03-08 RX ADMIN — Medication 600 MILLIGRAM(S): at 00:24

## 2019-03-08 RX ADMIN — Medication 1 TABLET(S): at 12:16

## 2019-03-08 NOTE — PROGRESS NOTE ADULT - ATTENDING COMMENTS
Patient seen and examined at bedside. Agree with above evaluation. Plan for discharge today. Patient to follow up in office in 6w

## 2019-03-08 NOTE — PROGRESS NOTE ADULT - SUBJECTIVE AND OBJECTIVE BOX
Patient evaluated at bedside.     She reports pain is well controlled.    She has been ambulating without assistance, voiding spontaneously, and is breastfeeding.    She denies HA, dizziness, chest pain, palpitations, shortness of breath, n/v, heavy vaginal bleeding or perineal discomfort.    Physical Exam:  Vital Signs Last 24 Hrs  T(C): 37.1 (08 Mar 2019 06:10), Max: 37.1 (08 Mar 2019 06:10)  T(F): 98.8 (08 Mar 2019 06:10), Max: 98.8 (08 Mar 2019 06:10)  HR: 104 (08 Mar 2019 06:10) (80 - 104)  BP: 117/78 (08 Mar 2019 06:10) (117/78 - 122/80)  BP(mean): --  RR: 17 (08 Mar 2019 06:10) (17 - 18)  SpO2: 99% (08 Mar 2019 06:10) (98% - 99%)    GA: NAD, A+0 x 3  Abd: + BS, soft, nontender, nondistended, no rebound or guarding, uterus firm at midline  : lochia WNL  Extremities: no edema or calf tenderness                          11.3   8.62  )-----------( 151      ( 06 Mar 2019 12:07 )             33.9

## 2019-03-08 NOTE — LACTATION INITIAL EVALUATION - NS LACT CON REASON FOR REQ
multiparous mom/Pt. is a P2 at 40.1 wks. gestation via vaginal delivery.  Pt. states she was Dx with hypothyroidism during the pregnancy and was medicated with levothyroxine.  Pt. states she is to see endocrinologist in 6 wks. to have thyroid levels checked.  Discussed with pt. low thyroid levels can decrease milk supply.   Baby is >than 36 hrs. old, has adequate voids and stools, weight loss 3.5% and is LGA.  Pt. states baby has been cluster feeding all night.  Pt. states at times baby becomes irritable and frantic at breasts and will not be able to latch.  Presently, baby is in bassinet and pacifier is in use.  Pediatrician is present to assess baby.  Following assessment baby was placed to right breast by pt., baby was able to achieve a deep latch a wide gape was observed as well as flanged lips.  Bbay was able to maintain consistent sucking for approx. 8  minutes became fussy and came off breast.  Baby becomes irrritable when attempting to relatch but was  able to latch and continued to feed for another 7 minutes.  When baby removed himself from the breast the nipple did not show signs of compression.  Discussed with pt. supplementing baby due to pt. relaying that baby does not appear satisfied.  Pt. appeared reluctant with use of formula and then discussed pumping and providing collected EBM to baby.  FOB presently holding baby and baby is sleeping.  Pt. was able to pump 6ml. of colostrum and will save for next feed.  Pt. understands to breastfeed baby first and then supplement baby with collected colostrum followed with pumping.  Discussed with pt. if baby seems satisfied with following feedings she does not need to continue to pump/supplement.  Baby continued to sleep while pt. pumped and FOB stated he could feel gas being passed while baby slept.  Provided pt. with written guidelines for supplementation amounts.

## 2019-03-12 DIAGNOSIS — E03.9 HYPOTHYROIDISM, UNSPECIFIED: ICD-10-CM

## 2019-03-12 DIAGNOSIS — Z3A.40 40 WEEKS GESTATION OF PREGNANCY: ICD-10-CM

## 2019-03-12 DIAGNOSIS — Z34.83 ENCOUNTER FOR SUPERVISION OF OTHER NORMAL PREGNANCY, THIRD TRIMESTER: ICD-10-CM

## 2019-03-14 LAB — SURGICAL PATHOLOGY STUDY: SIGNIFICANT CHANGE UP

## 2021-06-03 NOTE — PATIENT PROFILE OB - PRO HBSAG INFANT
negative Closure 2 Information: This tab is for additional flaps and grafts, including complex repair and grafts and complex repair and flaps. You can also specify a different location for the additional defect, if the location is the same you do not need to select a new one. We will insert the automated text for the repair you select below just as we do for solitary flaps and grafts. Please note that at this time if you select a location with a different insurance zone you will need to override the ICD10 and CPT if appropriate.

## 2022-08-30 NOTE — PROGRESS NOTE ADULT - ASSESSMENT
Patient called asking why an ankle brace was ordered? He states that there is nothing wrong with his ankle. He wants to know why a back brace wasn't ordered?        Call back number is [phone]
Patient returned your call.
Returned call VOICE MAIL NOT SET UP
30 yo s/p  with third degree
A/P  31y   s/p , PPD #1  ,stable  1. Pain: well controlled on OPM  2. GI: Regular diet  3. : voiding spontaneously  4. DVT prophylaxis: Ambulation  5. Dispo: PPD 2, unless otherwise specified
A/P  31y   s/p , PPD #2  ,stable  1. Pain: well controlled on OPM  2. GI: Regular diet  3.  (3rd degree laceration): voiding spontaneously; bowel movement yesterday; continue taking colace  4. DVT prophylaxis: Ambulation  5. Dispo: PPD 2, unless otherwise specified

## 2025-04-19 NOTE — DISCHARGE NOTE OB - NS MD DC PLAN IMMU FLU REF OTH
LABOR PROGRESS NOTE    Subjective  Patient feeling intermittent pressure. Overall comfortable.    Objective  Vitals:    25 0930   BP: 96/41   Pulse: 86   Resp:    Temp:        SVE: 5/70/-2, caput present, IUPC placed with patient consent  FHT: baseline 140, minimal to moderate variability, no accels, - decels, cat 2  [place]: q2-5minutes     Recent Labs   Lab 258   WBC 8.7   RBC 4.65   HGB 10.6*   HCT 32.9*          Assessment & Plan  Glory Hernandez is a 23 year old  at 40w3d  d/b 27 wk US admitted for eIOL.    #Induction of labor  - FWB: Cat tracing 2 > continue to reposition  - CETM/IUPC  - GBS: neg, no ppx abx required  - Pain: epidural infusing  - Pitocin per protocol since 517, currently at 6 units  - AROM at 0359    #fibroid- anterior subserosal 7x7cm, no GRACIELA involvement   #obesity BMI 42  #Anemia- s/p IV Fe  #SMA carrier- s/p genetic counseling  #hep B non immune- pt declined vax  #late to prenatal care  #hx pos ab screen, most recent negative      Plan of care discussed with patient. Questions encouraged and answered.    Discussed with OB team.    Caro Kovacs MD  Obstetrics and Gynecology PGY-1    Out of season